# Patient Record
Sex: FEMALE
[De-identification: names, ages, dates, MRNs, and addresses within clinical notes are randomized per-mention and may not be internally consistent; named-entity substitution may affect disease eponyms.]

---

## 2020-12-15 ENCOUNTER — DASHBOARD ENCOUNTERS (OUTPATIENT)
Age: 35
End: 2020-12-15

## 2020-12-15 ENCOUNTER — OFFICE VISIT (OUTPATIENT)
Dept: URBAN - METROPOLITAN AREA TELEHEALTH 2 | Facility: TELEHEALTH | Age: 35
End: 2020-12-15
Payer: COMMERCIAL

## 2020-12-15 DIAGNOSIS — R19.4 CHANGE IN BOWEL FUNCTION: ICD-10-CM

## 2020-12-15 DIAGNOSIS — R63.4 WEIGHT LOSS: ICD-10-CM

## 2020-12-15 DIAGNOSIS — R10.13 EPIGASTRIC ABDOMINAL PAIN: ICD-10-CM

## 2020-12-15 DIAGNOSIS — D50.8 OTHER IRON DEFICIENCY ANEMIA: ICD-10-CM

## 2020-12-15 PROBLEM — 80774000 HELICOBACTER PYLORI: Status: ACTIVE | Noted: 2020-12-15

## 2020-12-15 PROBLEM — 54586004 LOWER ABDOMINAL PAIN: Status: ACTIVE | Noted: 2020-12-15

## 2020-12-15 PROBLEM — 816160009 WEIGHT LOSS: Status: ACTIVE | Noted: 2020-12-15

## 2020-12-15 PROBLEM — 87522002 IRON DEFICIENCY ANEMIA: Status: ACTIVE | Noted: 2020-12-15

## 2020-12-15 PROBLEM — 88111009 ALTERED BOWEL FUNCTION: Status: ACTIVE | Noted: 2020-12-15

## 2020-12-15 PROCEDURE — G8483 FLU IMM NO ADMIN DOC REA: HCPCS | Performed by: INTERNAL MEDICINE

## 2020-12-15 PROCEDURE — 99203 OFFICE O/P NEW LOW 30 MIN: CPT | Performed by: INTERNAL MEDICINE

## 2020-12-15 PROCEDURE — 99243 OFF/OP CNSLTJ NEW/EST LOW 30: CPT | Performed by: INTERNAL MEDICINE

## 2020-12-15 RX ORDER — ESOMEPRAZOLE MAGNESIUM 20 MG/1
1 CAPSULE CAPSULE, DELAYED RELEASE ORAL ONCE A DAY
Qty: 30 | Refills: 0 | OUTPATIENT
Start: 2020-12-15

## 2020-12-15 NOTE — HPI-TODAY'S VISIT:
34 yo female  -having 10 pound weight loss since august- unintentional.  had a similar episode in 2016 when she lost her job but gained 30 pounds back.  no change in her appetite- she is still eating regularly.  -c/o epigastric pain and discomfort for 1 month.  this is similar to when she had pain in 2014 and was diagnosed with h pylori . not constant.  it is daily though , unsure what trigggers it. not associated with eating. can come abruptly.   the pain happens during the day, while she is at work.  can last for the whole day.   she feels that when she pushes on her stomach it does hurt.  she feels that there is some acid reflux.  she is not taking anything for this she does not notice anything that triggers the acid reflux, feels that this is daily as well.  she feels it is not disrupting her work.  no nausea or vomiting.  no black stool.  she has used nexium before which has helped.  -hx of iron deficiency anemia- last bloodwork about a year ago.  usually sees [facility] , Dr. Panda . was diagnosed with iron deficiency in 2004 and had a blood transfusion -she reports that her stool is generally solid, sometimes will get discomfort prior to going to the bathroom. when she has cheese she has severe pain - she will have urgency.  she has a lot of lower abdominal pain.  no blood in the stool.  no typical diarrhea.  no fh of colon cancer.  the lower abdominal pain is new over the past month. it is not always related to food.    -had choly in 2002 while in high school -took amitryptiline after an accident. no regular meds -she takes nsaids prn

## 2020-12-31 ENCOUNTER — OFFICE VISIT (OUTPATIENT)
Dept: URBAN - METROPOLITAN AREA SURGERY CENTER 15 | Facility: SURGERY CENTER | Age: 35
End: 2020-12-31

## 2020-12-31 ENCOUNTER — OFFICE VISIT (OUTPATIENT)
Dept: URBAN - METROPOLITAN AREA SURGERY CENTER 15 | Facility: SURGERY CENTER | Age: 35
End: 2020-12-31
Payer: COMMERCIAL

## 2020-12-31 DIAGNOSIS — K22.8 COLUMNAR-LINED ESOPHAGUS: ICD-10-CM

## 2020-12-31 DIAGNOSIS — K29.60 ADENOPAPILLOMATOSIS GASTRICA: ICD-10-CM

## 2020-12-31 DIAGNOSIS — R10.84 ABDOMINAL CRAMPING, GENERALIZED: ICD-10-CM

## 2020-12-31 PROCEDURE — G9937 DIG OR SURV COLSCO: HCPCS | Performed by: INTERNAL MEDICINE

## 2020-12-31 PROCEDURE — 45378 DIAGNOSTIC COLONOSCOPY: CPT | Performed by: INTERNAL MEDICINE

## 2020-12-31 PROCEDURE — G8907 PT DOC NO EVENTS ON DISCHARG: HCPCS | Performed by: INTERNAL MEDICINE

## 2020-12-31 PROCEDURE — 43239 EGD BIOPSY SINGLE/MULTIPLE: CPT | Performed by: INTERNAL MEDICINE

## 2020-12-31 RX ORDER — ESOMEPRAZOLE MAGNESIUM 20 MG/1
1 CAPSULE CAPSULE, DELAYED RELEASE ORAL ONCE A DAY
Qty: 30 | Refills: 0 | Status: ACTIVE | COMMUNITY
Start: 2020-12-15